# Patient Record
Sex: MALE | Race: OTHER | HISPANIC OR LATINO | Employment: FULL TIME | URBAN - METROPOLITAN AREA
[De-identification: names, ages, dates, MRNs, and addresses within clinical notes are randomized per-mention and may not be internally consistent; named-entity substitution may affect disease eponyms.]

---

## 2023-05-08 ENCOUNTER — OFFICE VISIT (OUTPATIENT)
Dept: URGENT CARE | Facility: CLINIC | Age: 28
End: 2023-05-08

## 2023-05-08 VITALS
RESPIRATION RATE: 19 BRPM | DIASTOLIC BLOOD PRESSURE: 99 MMHG | HEIGHT: 70 IN | WEIGHT: 222.25 LBS | BODY MASS INDEX: 31.82 KG/M2 | HEART RATE: 116 BPM | TEMPERATURE: 99 F | SYSTOLIC BLOOD PRESSURE: 150 MMHG | OXYGEN SATURATION: 97 %

## 2023-05-08 DIAGNOSIS — J02.9 PHARYNGITIS, UNSPECIFIED ETIOLOGY: Primary | ICD-10-CM

## 2023-05-08 DIAGNOSIS — Z76.89 ENCOUNTER TO ESTABLISH CARE: ICD-10-CM

## 2023-05-08 DIAGNOSIS — J02.9 SORE THROAT: ICD-10-CM

## 2023-05-08 PROCEDURE — 99203 OFFICE O/P NEW LOW 30 MIN: CPT | Mod: S$GLB,,,

## 2023-05-08 PROCEDURE — 99203 PR OFFICE/OUTPT VISIT, NEW, LEVL III, 30-44 MIN: ICD-10-PCS | Mod: S$GLB,,,

## 2023-05-08 RX ORDER — NYSTATIN 100000 [USP'U]/ML
6 SUSPENSION ORAL EVERY 6 HOURS
Qty: 240 ML | Refills: 0 | Status: SHIPPED | OUTPATIENT
Start: 2023-05-08 | End: 2023-05-18

## 2023-05-08 RX ORDER — LIDOCAINE HYDROCHLORIDE 20 MG/ML
SOLUTION OROPHARYNGEAL
Qty: 100 ML | Refills: 0 | Status: SHIPPED | OUTPATIENT
Start: 2023-05-08

## 2023-05-08 NOTE — PROGRESS NOTES
"Subjective:      Patient ID: Dinesh Cervantes is a 27 y.o. male.    Vitals:  height is 5' 9.69" (1.77 m) and weight is 100.8 kg (222 lb 3.6 oz). His oral temperature is 99 °F (37.2 °C). His blood pressure is 150/99 (abnormal) and his pulse is 116 (abnormal). His respiration is 19 and oxygen saturation is 97%.     Chief Complaint: Sore Throat    Patient states that he is having throat pain. He states that he also notices some white spots on his tongue. Patient states that he has noticed when he drinks alcohol, is stressed, or drinks something hot his throat pain worsens. Patient states that he is able to tolerate food and beverage. His symptoms have been intermittent for 4 weeks. Patient states that when his symptoms first occurred, he does not remember coming into contact with anything or doing anything to cause his symptoms to start. Associated symptoms include constipation/diarrhea. Patient states that he has not taken anything for his symptoms. He denies fever, chills, CP, SOB, nausea, vomiting, abdominal pain. Patient states that he is currently not sexually active.    Sore Throat   This is a new problem. The current episode started 1 to 4 weeks ago. The problem has been unchanged. There has been no fever. The pain is at a severity of 0/10. The patient is experiencing no pain. Associated symptoms include diarrhea. Pertinent negatives include no abdominal pain, congestion, coughing, drooling, ear discharge, ear pain, headaches, plugged ear sensation, neck pain, shortness of breath, swollen glands, trouble swallowing or vomiting. He has had no exposure to strep or mono. He has tried nothing for the symptoms. The treatment provided no relief.     Constitution: Negative for chills and fever.   HENT:  Positive for sore throat. Negative for ear pain, ear discharge, drooling, congestion and trouble swallowing.    Neck: Negative for neck pain.   Cardiovascular:  Negative for chest pain and sob on exertion.   Respiratory:  " Negative for cough and shortness of breath.    Gastrointestinal:  Positive for constipation and diarrhea. Negative for abdominal pain and vomiting.   Musculoskeletal:  Negative for muscle ache.   Skin:  Negative for rash.   Neurological:  Negative for headaches.    Objective:     Physical Exam   Constitutional:  Non-toxic appearance. He does not appear ill. No distress.      Comments:Patient is in no acute distress.   normal  HENT:   Head: Normocephalic.   Ears:   Right Ear: Tympanic membrane, external ear and ear canal normal. impacted cerumen  Left Ear: Tympanic membrane, external ear and ear canal normal. impacted cerumen  Nose: No rhinorrhea or congestion.   Mouth/Throat: Uvula is midline and mucous membranes are normal. No oral lesions. No trismus in the jaw. Normal dentition. No uvula swelling, lacerations or dental caries. Posterior oropharyngeal erythema present. No oropharyngeal exudate, posterior oropharyngeal edema, tonsillar abscesses or cobblestoning. Tonsils are 1+ on the right. Tonsils are 1+ on the left. No tonsillar exudate. Oropharynx is clear.   There appears to be white discoloration appreciated on top of the tongue. This discoloration is able to be scrapped off of the tongue. There are no other oral lesions or abnormalities appreciated on exam.  No drooling, no tripoding. Patient is able to handle secretions. Patient appears to be in no acute respiratory distress. Airway is intact. Patient is able to talk in complete sentences without discomfort.      Comments: There appears to be white discoloration appreciated on top of the tongue. This discoloration is able to be scrapped off of the tongue. There are no other oral lesions or abnormalities appreciated on exam.  No drooling, no tripoding. Patient is able to handle secretions. Patient appears to be in no acute respiratory distress. Airway is intact. Patient is able to talk in complete sentences without discomfort.  Cardiovascular: Regular rhythm  and normal heart sounds. Tachycardia present.   No murmur heard.Exam reveals no gallop and no friction rub.   Pulmonary/Chest: Effort normal and breath sounds normal. No stridor. No respiratory distress. He has no wheezes. He has no rhonchi. He has no rales. He exhibits no tenderness.         Comments: Patient is in no respiratory distress. Breath sounds even, unlabored, and clear to auscultation bilaterally. No accessory muscle usage. Patient able to speak in complete sentences with ease.    Abdominal: Normal appearance and bowel sounds are normal. He exhibits no distension and no mass. Soft. There is no abdominal tenderness. There is no rebound, no guarding, no left CVA tenderness and no right CVA tenderness. No hernia.   Lymphadenopathy:     He has no cervical adenopathy.   Neurological: He is alert.   Skin: Skin is not diaphoretic.   Nursing note and vitals reviewed.    Assessment:     1. Pharyngitis, unspecified etiology    2. Sore throat    3. Encounter to establish care      Plan:   Previous notes reviewed.  Vital signs reviewed.  Discussed pharyngitis caused to suspected thrush, home care, tx options, and given follow up precautions.  Patient was briefed on my thought process and diagnosis.   Patient involved with the treatment plan and agreed to the plan.  Patient informed on warning signs, patient understood warning signs and to go to urgent care or ER if warning signs appear.  Patient discussed with Dr. Fab Sierra in clinic, Dr. Sierra agrees to the plan.     Will treat for thrush due to patient presentation. There is no exudate on the tonsils, no lymphadenopathy appreciated, and the patient is not running fever. With patient paying out of pocket and strep appearing to be less likely, no strep test will be performed. Will symptomatically treat the patient for sore throat as well as treating suspected thrush. Patient strongly encouraged to follow up with PCP for persistent symptoms and further workup of  the cause of his symptoms. Throat culture and strep test both cancelled.    Patient Instructions   Please return here or go to the Emergency Department for any concerns or worsening of condition.    Please swish NYSTATIN in the mouth as long as possible (several minute) then swallow this medication. Be sure to take 6mL every 6 hours for the next 10 days.    Please swish and spit LIDOCAINE for sore throat relief.    Please follow up with your primary care doctor or specialist as needed.    If you  smoke, please stop smoking.    You were given a referral to internal medicine (primary care), please call [phone] for scheduling and appointments.         Access Navigator team who handles Medicaid referrals INTO [facility]. The main line for that team (for your referrals into [facility]) is [phone] Access Line - helps Medicaid patients to find Medicaid care OUTSIDE of [facility]. Medicaid Access Line contact is: [phone]  www.[address] [place] [address].org - 2967246434 - a community line that can help refer       [facility]:  Marketplace and Medicaid Enrollment Assistance, Free or Low-Cost Care  Name Address Phone # Type of Care  [facility] [place] [address] [place] [address] [phone] Primary Care  [facility] [address], [place] [address] [phone] Primary Care  [facility] [place] [address] [place] [address] [phone] Primary Care  [facility] [address] [facility], [place] [id number] [phone] Primary Care  [facility] [place] [address] [facility] [place] [id number] [phone] Primary Care  [facility] [address] [place]  [address] [phone] [facility] Dept. - Health Care  for the Homeless ([facility])  [address] [facility], [place] [address]  [phone] Primary Care  [facility] ([place]) [address] [place] [address] [phone] Primary Care  [facility] [place] [address] [place] [facility] [place]  [address]  [phone] Primary Care  [facility] - [facility] [place] [address] [place] [address] [phone] Primary Care  [facility] [address], [place]  [address]  [phone] Primary Care  [facility] [address] [place] [address] [phone] Primary Care  [facility] [address] [facility], [address], [place] [address]  [phone] Primary Care  [facility] [address], [address], [place] [address]  [phone] Primary Care  [facility]) [address] [place] [address] [phone] Primary Care  [facility] [address] [place] [address] [phone] Primary Care  [place] [facility] ([place]) [address] [place]  [address]  [phone] Primary Care  NO/AIDS Task Force ([facility]) [address], [address] [place]  [address]  [phone] HIV/AIDS care  [facility] ([facility])  [address], [place] [address]  [phone] Primary Care  [facility] ([place])  [address] [facility] [place] [address] [phone] Primary Care  [facility] ([address]) [address] [facility], [place] [address] [phone] Primary Care  [facility] ([address]) [address] [place], [place] [id number] [phone] Primary Care  [facility] Soriano/[facility]  ([facility])  [address] [facility] [place], [place] [id number] [phone] Primary Care  [facility]  Drop-[facility] Clinic at [facility] ([place]) [address] [place] [address] [phone] Primary Care  [facility] [address], [place] [address] [phone] Primary Care  [facility] [address], [place] [facility]  [place], [place] [id number] [phone] Primary Care  Affordable Care Act Navigators  Navigators are individuals or organizations that are trained to help consumers, small businesses, and  their employees as they look for health coverage options through the Marketplace, including completing  eligibility and enrollment forms. These individuals and organizations are required to be unbiased. Their  services are free to consumers.  [facility] ([facility])  Request an appointment through:  [email or web address] or [phone]  [address] [place] [address] (office covers all of [facility])  [facility] [address] [place] [address]  10am -7pm Monday to Friday  10am to 2pm on Saturdays  [phone]  [email or web address]    Pharyngitis, unspecified etiology  -     nystatin (MYCOSTATIN) 100,000 unit/mL suspension; Take 6 mLs (600,000 Units total) by mouth every 6 (six) hours. for 10 days  Dispense: 240 mL; Refill: 0  -     LIDOcaine HCl 2% (LIDOCAINE VISCOUS) 2 % Soln; Swish and spit 15 mL every 3 hours as needed for sore throat.  Dispense: 100 mL; Refill: 0    Sore throat  -     Cancel: POCT Strep A, Molecular  -     CULTURE, RESPIRATORY  - THROAT    Encounter to establish care  -     Ambulatory referral/consult to Internal Medicine      LalaPedro Pablo Randall PA-C

## 2023-05-08 NOTE — PATIENT INSTRUCTIONS
Please return here or go to the Emergency Department for any concerns or worsening of condition.    Please swish NYSTATIN in the mouth as long as possible (several minute) then swallow this medication. Be sure to take 6mL every 6 hours for the next 10 days.    Please swish and spit LIDOCAINE for sore throat relief.    Please follow up with your primary care doctor or specialist as needed.    If you  smoke, please stop smoking.    You were given a referral to internal medicine (primary care), please call [phone] for scheduling and appointments.         Access Navigator team who handles Medicaid referrals INTO [facility]. The main line for that team (for your referrals into [facility]) is [phone] Access Line - helps Medicaid patients to find Medicaid care OUTSIDE of [facility]. Medicaid Access Line contact is: [phone]  www.[address] [place] [address].org - 1237423722 - a community line that can help refer       [facility]:  Marketplace and Medicaid Enrollment Assistance, Free or Low-Cost Care  Name Address Phone # Type of Care  [facility] [address] [facility] [address] [place] [address] [phone] Primary Care  [facility] [address] [facility] [address], [place] [address] [phone] Primary Care  [facility] [place] [address] [place] [address] [phone] Primary Care  [facility] [place] [address] [facility], [place] [id number] [phone] Primary Care  [facility] [address] [facility], [place] [address] [phone] Primary Care  [facility] [address], [place]  [address] [phone] [facility] Dept. - Health Care  for the Homeless ([place])  [address], [facility] [place],  [place] [id number] [phone] Primary Care  [facility]  [facility] ([place]) [address] [facility] [address] [phone] Primary Care  [facility] [place] [address] [place]  [address]  [phone] Primary Care  [facility] [place] [place] [address] [place] [address] [phone] Primary Care  [facility] [address], [place]  [address]  [phone] Primary Care  [facility] [address] [place] [address] [phone] Primary Care  [facility] [address] [facility], [address], [place] [address]  [phone] Primary Care  [facility] [address] [place] [address], [place] [address]  [phone] Primary Care  [facility] ([place]) [address] [place] [address] [phone] Primary Care  [facility] [address] [place] [address] [phone] Primary Care  [place] [facility] ([place]) [address] [place]  [address]  [phone] Primary Care  NO/AIDS Task Force ([facility]) [address], [address] [place]  [address]  [phone] HIV/AIDS care  [facility] ([facility])  [address], [place] [address]  [phone] Primary Care  [facility] ([place])  [address] [place] [address] [phone] Primary Care  [facility] ([address]) [address] [place] [address] [phone] Primary Care  [facility] ([address]) [address] [place] [address] [phone] Primary Care  [facility] Sor[facility]  ([facility])  [address] [place] [place] [address] [phone] Primary Care  [facility] at [facility] ([facility]) [address] [facility], [place] [id number] [phone] Primary  Care  [facility] [address], [place] [address] [phone] Primary Care  [facility] [address], [place] [address]  [place] [address] [phone] Primary Care  Affordable Care Act Navigators  Navigators are individuals or organizations that are trained to help consumers, small businesses, and  their employees as they look for health coverage options through the Marketplace, including completing  eligibility and enrollment forms. These individuals and organizations are required to be unbiased. Their  services are free to consumers.  [facility] Health Education [facility] ([facility])  Request an appointment through:  [email or web address] or [phone]  [address] [place] [address] (office covers all of [facility])  [facility] [address] [place] [address]  10am -7pm Monday to Friday  10am to 2pm on Saturdays  [phone]  [email or web address]

## 2024-09-05 ENCOUNTER — OFFICE VISIT (OUTPATIENT)
Dept: URGENT CARE | Facility: CLINIC | Age: 29
End: 2024-09-05

## 2024-09-05 VITALS
BODY MASS INDEX: 37.46 KG/M2 | HEART RATE: 92 BPM | RESPIRATION RATE: 16 BRPM | TEMPERATURE: 99 F | SYSTOLIC BLOOD PRESSURE: 138 MMHG | DIASTOLIC BLOOD PRESSURE: 94 MMHG | WEIGHT: 261.69 LBS | HEIGHT: 70 IN | OXYGEN SATURATION: 96 %

## 2024-09-05 DIAGNOSIS — H60.332 ACUTE SWIMMER'S EAR OF LEFT SIDE: Primary | ICD-10-CM

## 2024-09-05 PROCEDURE — 99214 OFFICE O/P EST MOD 30 MIN: CPT | Mod: TIER,S$GLB,, | Performed by: PHYSICIAN ASSISTANT

## 2024-09-05 RX ORDER — NEOMYCIN SULFATE, POLYMYXIN B SULFATE AND HYDROCORTISONE 10; 3.5; 1 MG/ML; MG/ML; [USP'U]/ML
4 SUSPENSION/ DROPS AURICULAR (OTIC) 4 TIMES DAILY
Qty: 10 ML | Refills: 0 | Status: SHIPPED | OUTPATIENT
Start: 2024-09-05 | End: 2024-09-12

## 2024-09-05 NOTE — PATIENT INSTRUCTIONS
PLEASE READ YOUR DISCHARGE INSTRUCTIONS ENTIRELY AS IT CONTAINS IMPORTANT INFORMATION.  Please give the pharmacy your goodrx card to get a discount on the ear drops.  - Rest.    - Drink plenty of fluids.    - Tylenol or Ibuprofen as directed as needed for fever/pain.    - If you were prescribed antibiotics, please take them to completion.  - If you are female and on birth control pills - please use additional methods of contraception to prevent pregnancy while on antibiotics and for one cycle after.   - If you were prescribed a narcotic medication, a cough syrup, or a muscle relaxer, do not drive or operate heavy equipment or machinery while taking these medications, as they can cause drowsiness.   - If a referral to a specialty was made today, you should receive a phone call in the next few days to schedule an appt.  Please call [phone] to schedule an appt if have not gotten a phone call in the next few days.  - If you smoke, please stop smoking.  -You must understand that you've received an Urgent Care treatment only and that you may be released before all your medical problems are known or treated. You, the patient, will arrange for follow up care as instructed. Please arrange follow up with your primary medical clinic as soon as possible.   - Follow up with your PCP or specialty clinic as directed in the next 1-2 weeks if not improved or as needed.  You can call [phone] to schedule an appointment with the appropriate provider.    - Please return to Urgent Care or to the Emergency Department if your symptoms worsen.    Patient aware and verbalized understanding.

## 2024-09-05 NOTE — PROGRESS NOTES
"Subjective:      Patient ID: Dinesh Cervantes is a 29 y.o. male.    Vitals:  height is 5' 9.69" (1.77 m) and weight is 118.7 kg (261 lb 11 oz). His oral temperature is 98.5 °F (36.9 °C). His blood pressure is 138/94 (abnormal) and his pulse is 92. His respiration is 16 and oxygen saturation is 96%.     Chief Complaint: Otalgia    Mr. Cervantes presents for evaluation of left ear pain that started 3 days ago.  He reports he went swimming in [facility] last Monday.  He denies any decreased hearing, fevers, chills, pain behind the ear, or drainage.  He did have some sneezing & congestion today.  He took ibuprofen last night with relief of pain.     Otalgia   There is pain in the left ear. This is a new problem. The current episode started in the past 7 days (3 days ago). The problem occurs constantly. The problem has been gradually worsening. There has been no fever. The pain is at a severity of 4/10. The pain is mild. Pertinent negatives include no abdominal pain, coughing, diarrhea, ear discharge, headaches, hearing loss, neck pain, rash, rhinorrhea, sore throat or vomiting. Treatments tried: ibuprofen. The treatment provided moderate relief. There is no history of a chronic ear infection, hearing loss or a tympanostomy tube.       Constitution: Negative for appetite change, chills, sweating, fatigue and fever.   HENT:  Positive for ear pain. Negative for ear discharge, foreign body in ear, tinnitus, hearing loss, postnasal drip, sinus pain, sinus pressure and sore throat.    Neck: Negative for neck pain and neck stiffness.   Cardiovascular:  Negative for chest trauma, chest pain, leg swelling, palpitations, sob on exertion and passing out.   Eyes:  Negative for blurred vision.   Respiratory:  Negative for cough and shortness of breath.    Gastrointestinal:  Negative for abdominal pain, nausea, vomiting and diarrhea.   Genitourinary:  Negative for dysuria, frequency and urgency.   Musculoskeletal:  Negative for " pain.   Skin:  Negative for rash.   Neurological:  Negative for dizziness, history of vertigo, light-headedness, passing out, facial drooping, speech difficulty, coordination disturbances, loss of balance and headaches.   Hematologic/Lymphatic: Negative for easy bruising/bleeding. Does not bruise/bleed easily.   Psychiatric/Behavioral:  Negative for confusion.       Objective:     Physical Exam   Constitutional: He is oriented to person, place, and time. He appears well-developed. He is cooperative.  Non-toxic appearance. He does not appear ill. No distress.   HENT:   Head: Normocephalic and atraumatic.   Ears:   Right Ear: Hearing, tympanic membrane, external ear and ear canal normal. Tympanic membrane is not scarred, not perforated and not erythematous. No middle ear effusion.   Left Ear: Hearing, tympanic membrane, external ear and ear canal normal. No lacerations. There is swelling and tenderness. No no drainage. No foreign bodies. No mastoid tenderness. Tympanic membrane is not injected, not scarred, not perforated and not erythematous.  No middle ear effusion. No hemotympanum. No decreased hearing is noted.   Nose: Nose normal. No mucosal edema, rhinorrhea or nasal deformity. No epistaxis. Right sinus exhibits no maxillary sinus tenderness and no frontal sinus tenderness. Left sinus exhibits no maxillary sinus tenderness and no frontal sinus tenderness.   Mouth/Throat: Uvula is midline, oropharynx is clear and moist and mucous membranes are normal. No trismus in the jaw. Normal dentition. No uvula swelling. No oropharyngeal exudate, posterior oropharyngeal edema or posterior oropharyngeal erythema.   L auditory canal with swelling and erythema.    TM is intact & clear.  TTP tragus and with exam.  No mastoid TTP.      Comments: L auditory canal with swelling and erythema.    TM is intact & clear.  TTP tragus and with exam.  No mastoid TTP.  Eyes: Conjunctivae and lids are normal. No scleral icterus.   Neck:  Trachea normal and phonation normal. Neck supple. No edema present. No erythema present. No neck rigidity present.   Cardiovascular: Normal rate, regular rhythm, normal heart sounds and normal pulses.   Pulmonary/Chest: Effort normal and breath sounds normal. No respiratory distress. He has no decreased breath sounds. He has no rhonchi.   Abdominal: Normal appearance.   Musculoskeletal: Normal range of motion.         General: No deformity. Normal range of motion.   Neurological: He is alert and oriented to person, place, and time. He exhibits normal muscle tone. Coordination normal.   Skin: Skin is warm, dry, intact, not diaphoretic and not pale.   Psychiatric: His speech is normal and behavior is normal. Judgment and thought content normal.   Nursing note and vitals reviewed.      Assessment:     1. Acute swimmer's ear of left side        Plan:       Acute swimmer's ear of left side    Other orders  -     neomycin-polymyxin-hydrocortisone (CORTISPORIN) 3.5-10,000-1 mg/mL-unit/mL-% otic suspension; Place 4 drops into the left ear 4 (four) times daily. for 7 days  Dispense: 10 mL; Refill: 0    Patient was given Goodrx card and Rx for cortisporin.  He should be able to fill the Rx for about $22 per the website.   Continue ibuprofen or tylenol PRN.  Diagnoses and plan discussed with the patient, as well as the expected course and duration of his symptoms.  All questions and concerns were addressed prior to discharge.  He was advised to follow up with his PCP within 1 week if symptoms do not improve.  Emergency department precautions were given.  Patient verbalized understanding and was happy with the plan of care.   Note dictated with voice recognition software, please excuse any grammatical errors.      Patient Instructions   PLEASE READ YOUR DISCHARGE INSTRUCTIONS ENTIRELY AS IT CONTAINS IMPORTANT INFORMATION.  Please give the pharmacy your goodrx card to get a discount on the ear drops.  - Rest.    - Drink plenty  of fluids.    - Tylenol or Ibuprofen as directed as needed for fever/pain.    - If you were prescribed antibiotics, please take them to completion.  - If you are female and on birth control pills - please use additional methods of contraception to prevent pregnancy while on antibiotics and for one cycle after.   - If you were prescribed a narcotic medication, a cough syrup, or a muscle relaxer, do not drive or operate heavy equipment or machinery while taking these medications, as they can cause drowsiness.   - If a referral to a specialty was made today, you should receive a phone call in the next few days to schedule an appt.  Please call [phone] to schedule an appt if have not gotten a phone call in the next few days.  - If you smoke, please stop smoking.  -You must understand that you've received an Urgent Care treatment only and that you may be released before all your medical problems are known or treated. You, the patient, will arrange for follow up care as instructed. Please arrange follow up with your primary medical clinic as soon as possible.   - Follow up with your PCP or specialty clinic as directed in the next 1-2 weeks if not improved or as needed.  You can call [phone] to schedule an appointment with the appropriate provider.    - Please return to Urgent Care or to the Emergency Department if your symptoms worsen.    Patient aware and verbalized understanding.